# Patient Record
Sex: MALE | Race: BLACK OR AFRICAN AMERICAN | NOT HISPANIC OR LATINO | Employment: FULL TIME | URBAN - METROPOLITAN AREA
[De-identification: names, ages, dates, MRNs, and addresses within clinical notes are randomized per-mention and may not be internally consistent; named-entity substitution may affect disease eponyms.]

---

## 2021-09-16 ENCOUNTER — HOSPITAL ENCOUNTER (EMERGENCY)
Facility: HOSPITAL | Age: 56
Discharge: HOME | End: 2021-09-16
Attending: EMERGENCY MEDICINE
Payer: COMMERCIAL

## 2021-09-16 VITALS
OXYGEN SATURATION: 97 % | HEART RATE: 85 BPM | RESPIRATION RATE: 18 BRPM | BODY MASS INDEX: 45.1 KG/M2 | WEIGHT: 315 LBS | TEMPERATURE: 100 F | DIASTOLIC BLOOD PRESSURE: 82 MMHG | SYSTOLIC BLOOD PRESSURE: 138 MMHG | HEIGHT: 70 IN

## 2021-09-16 DIAGNOSIS — S82.142A CLOSED FRACTURE OF LEFT TIBIAL PLATEAU, INITIAL ENCOUNTER: Primary | ICD-10-CM

## 2021-09-16 DIAGNOSIS — S89.92XA LEFT KNEE INJURY: ICD-10-CM

## 2021-09-16 PROCEDURE — 99284 EMERGENCY DEPT VISIT MOD MDM: CPT | Mod: 25

## 2021-09-16 PROCEDURE — 29505 APPLICATION LONG LEG SPLINT: CPT | Mod: LT

## 2021-09-16 PROCEDURE — 25000003 PHARM REV CODE 250: Performed by: REGISTERED NURSE

## 2021-09-16 RX ORDER — HYDROCODONE BITARTRATE AND ACETAMINOPHEN 10; 325 MG/1; MG/1
1 TABLET ORAL EVERY 6 HOURS PRN
Qty: 12 TABLET | Refills: 0 | Status: SHIPPED | OUTPATIENT
Start: 2021-09-16 | End: 2022-01-28

## 2021-09-16 RX ORDER — HYDROCODONE BITARTRATE AND ACETAMINOPHEN 10; 325 MG/1; MG/1
1 TABLET ORAL
Status: COMPLETED | OUTPATIENT
Start: 2021-09-16 | End: 2021-09-16

## 2021-09-16 RX ADMIN — HYDROCODONE BITARTRATE AND ACETAMINOPHEN 1 TABLET: 10; 325 TABLET ORAL at 10:09

## 2021-09-17 ENCOUNTER — TELEPHONE (OUTPATIENT)
Dept: ORTHOPEDICS | Facility: CLINIC | Age: 56
End: 2021-09-17

## 2021-09-20 ENCOUNTER — HOSPITAL ENCOUNTER (OUTPATIENT)
Dept: RADIOLOGY | Facility: HOSPITAL | Age: 56
Discharge: HOME | End: 2021-09-20
Attending: ORTHOPAEDIC SURGERY
Payer: COMMERCIAL

## 2021-09-20 ENCOUNTER — OFFICE VISIT (OUTPATIENT)
Dept: ORTHOPEDICS | Facility: CLINIC | Age: 56
End: 2021-09-20
Payer: COMMERCIAL

## 2021-09-20 DIAGNOSIS — S82.145A CLOSED NONDISPLACED BICONDYLAR FRACTURE OF LEFT TIBIA, INITIAL ENCOUNTER: ICD-10-CM

## 2021-09-20 DIAGNOSIS — S82.145A CLOSED NONDISPLACED BICONDYLAR FRACTURE OF LEFT TIBIA, INITIAL ENCOUNTER: Primary | ICD-10-CM

## 2021-09-20 PROCEDURE — 99203 PR OFFICE/OUTPT VISIT, NEW, LEVL III, 30-44 MIN: ICD-10-PCS | Mod: 57,S$GLB,, | Performed by: ORTHOPAEDIC SURGERY

## 2021-09-20 PROCEDURE — 73560 X-RAY EXAM OF KNEE 1 OR 2: CPT | Mod: TC,LT

## 2021-09-20 PROCEDURE — 1159F MED LIST DOCD IN RCRD: CPT | Mod: CPTII,S$GLB,, | Performed by: ORTHOPAEDIC SURGERY

## 2021-09-20 PROCEDURE — 73560 XR KNEE 1 OR 2 VIEW LEFT: ICD-10-PCS | Mod: 26,LT,, | Performed by: RADIOLOGY

## 2021-09-20 PROCEDURE — 99999 PR PBB SHADOW E&M-EST. PATIENT-LVL III: CPT | Mod: PBBFAC,,, | Performed by: ORTHOPAEDIC SURGERY

## 2021-09-20 PROCEDURE — 93971 US LOWER EXTREMITY VEINS LEFT: ICD-10-PCS | Mod: 26,LT,, | Performed by: RADIOLOGY

## 2021-09-20 PROCEDURE — 93971 EXTREMITY STUDY: CPT | Mod: TC,LT

## 2021-09-20 PROCEDURE — 27530 PR CLOSED RX TIBIAL PLATEAU FX: ICD-10-PCS | Mod: LT,S$GLB,, | Performed by: ORTHOPAEDIC SURGERY

## 2021-09-20 PROCEDURE — 99999 PR PBB SHADOW E&M-EST. PATIENT-LVL III: ICD-10-PCS | Mod: PBBFAC,,, | Performed by: ORTHOPAEDIC SURGERY

## 2021-09-20 PROCEDURE — 27530 TREAT KNEE FRACTURE: CPT | Mod: LT,S$GLB,, | Performed by: ORTHOPAEDIC SURGERY

## 2021-09-20 PROCEDURE — 99203 OFFICE O/P NEW LOW 30 MIN: CPT | Mod: 57,S$GLB,, | Performed by: ORTHOPAEDIC SURGERY

## 2021-09-20 PROCEDURE — 1159F PR MEDICATION LIST DOCUMENTED IN MEDICAL RECORD: ICD-10-PCS | Mod: CPTII,S$GLB,, | Performed by: ORTHOPAEDIC SURGERY

## 2021-09-20 PROCEDURE — 73560 X-RAY EXAM OF KNEE 1 OR 2: CPT | Mod: 26,LT,, | Performed by: RADIOLOGY

## 2021-09-20 PROCEDURE — 93971 EXTREMITY STUDY: CPT | Mod: 26,LT,, | Performed by: RADIOLOGY

## 2021-09-20 RX ORDER — HYDROCHLOROTHIAZIDE 12.5 MG/1
12.5 TABLET ORAL DAILY
COMMUNITY
Start: 2021-07-15

## 2021-09-22 ENCOUNTER — CLINICAL SUPPORT (OUTPATIENT)
Dept: REHABILITATION | Facility: HOSPITAL | Age: 56
End: 2021-09-22
Attending: ORTHOPAEDIC SURGERY
Payer: COMMERCIAL

## 2021-09-22 DIAGNOSIS — S82.145A CLOSED NONDISPLACED BICONDYLAR FRACTURE OF LEFT TIBIA, INITIAL ENCOUNTER: ICD-10-CM

## 2021-09-22 DIAGNOSIS — R26.9 GAIT ABNORMALITY: ICD-10-CM

## 2021-09-22 DIAGNOSIS — M25.662 DECREASED RANGE OF MOTION OF LEFT KNEE: ICD-10-CM

## 2021-09-22 DIAGNOSIS — M62.81 QUADRICEPS WEAKNESS: ICD-10-CM
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
## 2021-09-22 PROCEDURE — 97162 PT EVAL MOD COMPLEX 30 MIN: CPT
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
## 2021-09-24 PROBLEM — M25.669 DECREASED RANGE OF MOTION (ROM) OF KNEE: Status: ACTIVE | Noted: 2021-09-24

## 2021-09-24 PROBLEM — R26.9 GAIT ABNORMALITY: Status: ACTIVE | Noted: 2021-09-24

## 2021-09-24 PROBLEM — M62.81 QUADRICEPS WEAKNESS: Status: ACTIVE | Noted: 2021-09-24

## 2021-09-27 ENCOUNTER — CLINICAL SUPPORT (OUTPATIENT)
Dept: REHABILITATION | Facility: HOSPITAL | Age: 56
End: 2021-09-27
Payer: COMMERCIAL

## 2021-09-27 DIAGNOSIS — M25.662 DECREASED RANGE OF MOTION OF LEFT KNEE: ICD-10-CM

## 2021-09-27 DIAGNOSIS — R26.9 GAIT ABNORMALITY: ICD-10-CM
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
## 2021-09-27 DIAGNOSIS — M62.81 QUADRICEPS WEAKNESS: ICD-10-CM
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
## 2021-09-27 PROCEDURE — 97110 THERAPEUTIC EXERCISES: CPT

## 2021-10-01 ENCOUNTER — CLINICAL SUPPORT (OUTPATIENT)
Dept: REHABILITATION | Facility: HOSPITAL | Age: 56
End: 2021-10-01
Payer: COMMERCIAL

## 2021-10-01 DIAGNOSIS — M25.662 DECREASED RANGE OF MOTION OF LEFT KNEE: ICD-10-CM
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
## 2021-10-01 DIAGNOSIS — M62.81 QUADRICEPS WEAKNESS: ICD-10-CM
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
## 2021-10-01 DIAGNOSIS — R26.9 GAIT ABNORMALITY: ICD-10-CM

## 2021-10-01 PROCEDURE — 97110 THERAPEUTIC EXERCISES: CPT

## 2021-10-04 ENCOUNTER — CLINICAL SUPPORT (OUTPATIENT)
Dept: REHABILITATION | Facility: HOSPITAL | Age: 56
End: 2021-10-04
Payer: COMMERCIAL

## 2021-10-04 DIAGNOSIS — M62.81 QUADRICEPS WEAKNESS: ICD-10-CM
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
## 2021-10-04 DIAGNOSIS — R26.9 GAIT ABNORMALITY: ICD-10-CM

## 2021-10-04 PROCEDURE — 97110 THERAPEUTIC EXERCISES: CPT

## 2021-10-05 DIAGNOSIS — M25.562 LEFT KNEE PAIN, UNSPECIFIED CHRONICITY: Primary | ICD-10-CM

## 2021-10-06 ENCOUNTER — HOSPITAL ENCOUNTER (OUTPATIENT)
Dept: RADIOLOGY | Facility: HOSPITAL | Age: 56
Discharge: HOME | End: 2021-10-06
Attending: PHYSICIAN ASSISTANT
Payer: COMMERCIAL

## 2021-10-06 ENCOUNTER — CLINICAL SUPPORT (OUTPATIENT)
Dept: REHABILITATION | Facility: HOSPITAL | Age: 56
End: 2021-10-06
Payer: COMMERCIAL

## 2021-10-06 ENCOUNTER — OFFICE VISIT (OUTPATIENT)
Dept: ORTHOPEDICS | Facility: CLINIC | Age: 56
End: 2021-10-06
Payer: COMMERCIAL

## 2021-10-06 VITALS — BODY MASS INDEX: 45.1 KG/M2 | HEIGHT: 70 IN | WEIGHT: 315 LBS

## 2021-10-06 DIAGNOSIS — M25.669 DECREASED RANGE OF MOTION OF KNEE, UNSPECIFIED LATERALITY: ICD-10-CM

## 2021-10-06 DIAGNOSIS — M25.562 LEFT KNEE PAIN, UNSPECIFIED CHRONICITY: ICD-10-CM

## 2021-10-06 DIAGNOSIS — M62.81 QUADRICEPS WEAKNESS: ICD-10-CM
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
## 2021-10-06 DIAGNOSIS — R26.9 GAIT ABNORMALITY: ICD-10-CM
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
## 2021-10-06 DIAGNOSIS — M25.562 LEFT KNEE PAIN, UNSPECIFIED CHRONICITY: Primary | ICD-10-CM

## 2021-10-06 DIAGNOSIS — S82.145A CLOSED NONDISPLACED BICONDYLAR FRACTURE OF LEFT TIBIA, INITIAL ENCOUNTER: ICD-10-CM

## 2021-10-06 PROCEDURE — 99999 PR PBB SHADOW E&M-EST. PATIENT-LVL III: ICD-10-PCS | Mod: PBBFAC,,, | Performed by: PHYSICIAN ASSISTANT

## 2021-10-06 PROCEDURE — 73562 XR KNEE ORTHO LEFT: ICD-10-PCS | Mod: 26,LT,, | Performed by: RADIOLOGY

## 2021-10-06 PROCEDURE — 1160F PR REVIEW ALL MEDS BY PRESCRIBER/CLIN PHARMACIST DOCUMENTED: ICD-10-PCS | Mod: CPTII,S$GLB,, | Performed by: PHYSICIAN ASSISTANT

## 2021-10-06 PROCEDURE — 3008F PR BODY MASS INDEX (BMI) DOCUMENTED: ICD-10-PCS | Mod: CPTII,S$GLB,, | Performed by: PHYSICIAN ASSISTANT

## 2021-10-06 PROCEDURE — 97110 THERAPEUTIC EXERCISES: CPT

## 2021-10-06 PROCEDURE — 3008F BODY MASS INDEX DOCD: CPT | Mod: CPTII,S$GLB,, | Performed by: PHYSICIAN ASSISTANT

## 2021-10-06 PROCEDURE — 73562 X-RAY EXAM OF KNEE 3: CPT | Mod: 26,LT,, | Performed by: RADIOLOGY

## 2021-10-06 PROCEDURE — 99024 PR POST-OP FOLLOW-UP VISIT: ICD-10-PCS | Mod: S$GLB,,, | Performed by: PHYSICIAN ASSISTANT

## 2021-10-06 PROCEDURE — 1159F PR MEDICATION LIST DOCUMENTED IN MEDICAL RECORD: ICD-10-PCS | Mod: CPTII,S$GLB,, | Performed by: PHYSICIAN ASSISTANT

## 2021-10-06 PROCEDURE — 73560 X-RAY EXAM OF KNEE 1 OR 2: CPT | Mod: 26,RT,, | Performed by: RADIOLOGY

## 2021-10-06 PROCEDURE — 1159F MED LIST DOCD IN RCRD: CPT | Mod: CPTII,S$GLB,, | Performed by: PHYSICIAN ASSISTANT

## 2021-10-06 PROCEDURE — 73560 XR KNEE ORTHO LEFT: ICD-10-PCS | Mod: 26,RT,, | Performed by: RADIOLOGY

## 2021-10-06 PROCEDURE — 1160F RVW MEDS BY RX/DR IN RCRD: CPT | Mod: CPTII,S$GLB,, | Performed by: PHYSICIAN ASSISTANT

## 2021-10-06 PROCEDURE — 73560 X-RAY EXAM OF KNEE 1 OR 2: CPT | Mod: TC,RT

## 2021-10-06 PROCEDURE — 99999 PR PBB SHADOW E&M-EST. PATIENT-LVL III: CPT | Mod: PBBFAC,,, | Performed by: PHYSICIAN ASSISTANT

## 2021-10-06 PROCEDURE — 99024 POSTOP FOLLOW-UP VISIT: CPT | Mod: S$GLB,,, | Performed by: PHYSICIAN ASSISTANT

## 2021-10-11 ENCOUNTER — CLINICAL SUPPORT (OUTPATIENT)
Dept: REHABILITATION | Facility: HOSPITAL | Age: 56
End: 2021-10-11
Payer: COMMERCIAL

## 2021-10-11 DIAGNOSIS — R26.9 GAIT ABNORMALITY: ICD-10-CM
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
## 2021-10-11 DIAGNOSIS — M25.669 DECREASED RANGE OF MOTION OF KNEE, UNSPECIFIED LATERALITY: ICD-10-CM
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
## 2021-10-11 DIAGNOSIS — M62.81 QUADRICEPS WEAKNESS: ICD-10-CM

## 2021-10-11 PROCEDURE — 97112 NEUROMUSCULAR REEDUCATION: CPT

## 2021-10-11 PROCEDURE — 97110 THERAPEUTIC EXERCISES: CPT

## 2021-10-13 ENCOUNTER — CLINICAL SUPPORT (OUTPATIENT)
Dept: REHABILITATION | Facility: HOSPITAL | Age: 56
End: 2021-10-13
Payer: COMMERCIAL

## 2021-10-13 ENCOUNTER — TELEPHONE (OUTPATIENT)
Dept: ORTHOPEDICS | Facility: CLINIC | Age: 56
End: 2021-10-13
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
## 2021-10-13 DIAGNOSIS — R26.9 GAIT ABNORMALITY: ICD-10-CM

## 2021-10-13 DIAGNOSIS — M62.81 QUADRICEPS WEAKNESS: ICD-10-CM
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
## 2021-10-13 PROCEDURE — 97112 NEUROMUSCULAR REEDUCATION: CPT

## 2021-10-13 PROCEDURE — 97110 THERAPEUTIC EXERCISES: CPT

## 2021-10-18 ENCOUNTER — CLINICAL SUPPORT (OUTPATIENT)
Dept: REHABILITATION | Facility: HOSPITAL | Age: 56
End: 2021-10-18
Payer: COMMERCIAL

## 2021-10-18 DIAGNOSIS — M62.81 QUADRICEPS WEAKNESS: ICD-10-CM
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
## 2021-10-18 DIAGNOSIS — S82.145A CLOSED NONDISPLACED BICONDYLAR FRACTURE OF LEFT TIBIA, INITIAL ENCOUNTER: Primary | ICD-10-CM

## 2021-10-18 DIAGNOSIS — M25.669 DECREASED RANGE OF MOTION OF KNEE, UNSPECIFIED LATERALITY: ICD-10-CM

## 2021-10-18 DIAGNOSIS — R26.9 GAIT ABNORMALITY: ICD-10-CM
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
## 2021-10-18 PROCEDURE — 97112 NEUROMUSCULAR REEDUCATION: CPT

## 2021-10-18 PROCEDURE — 97110 THERAPEUTIC EXERCISES: CPT

## 2021-10-20 ENCOUNTER — CLINICAL SUPPORT (OUTPATIENT)
Dept: REHABILITATION | Facility: HOSPITAL | Age: 56
End: 2021-10-20
Payer: COMMERCIAL

## 2021-10-20 ENCOUNTER — TELEPHONE (OUTPATIENT)
Dept: ORTHOPEDICS | Facility: CLINIC | Age: 56
End: 2021-10-20
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
## 2021-10-20 DIAGNOSIS — M25.669 DECREASED RANGE OF MOTION OF KNEE, UNSPECIFIED LATERALITY: ICD-10-CM
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
## 2021-10-20 DIAGNOSIS — M62.81 QUADRICEPS WEAKNESS: ICD-10-CM

## 2021-10-20 DIAGNOSIS — R26.9 GAIT ABNORMALITY: ICD-10-CM

## 2021-10-20 PROCEDURE — 97112 NEUROMUSCULAR REEDUCATION: CPT

## 2021-10-20 PROCEDURE — 97110 THERAPEUTIC EXERCISES: CPT

## 2021-11-01 ENCOUNTER — CLINICAL SUPPORT (OUTPATIENT)
Dept: REHABILITATION | Facility: HOSPITAL | Age: 56
End: 2021-11-01
Payer: COMMERCIAL

## 2021-11-01 DIAGNOSIS — R26.9 GAIT ABNORMALITY: ICD-10-CM
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
## 2021-11-01 DIAGNOSIS — M25.669 DECREASED RANGE OF MOTION OF KNEE, UNSPECIFIED LATERALITY: ICD-10-CM
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
## 2021-11-01 DIAGNOSIS — M62.81 QUADRICEPS WEAKNESS: ICD-10-CM

## 2021-11-01 PROCEDURE — 97110 THERAPEUTIC EXERCISES: CPT

## 2021-11-01 PROCEDURE — 97112 NEUROMUSCULAR REEDUCATION: CPT

## 2021-11-02 DIAGNOSIS — M25.562 LEFT KNEE PAIN, UNSPECIFIED CHRONICITY: Primary | ICD-10-CM

## 2021-11-03 ENCOUNTER — HOSPITAL ENCOUNTER (OUTPATIENT)
Dept: RADIOLOGY | Facility: HOSPITAL | Age: 56
Discharge: HOME | End: 2021-11-03
Attending: ORTHOPAEDIC SURGERY
Payer: COMMERCIAL

## 2021-11-03 ENCOUNTER — OFFICE VISIT (OUTPATIENT)
Dept: ORTHOPEDICS | Facility: CLINIC | Age: 56
End: 2021-11-03
Payer: COMMERCIAL

## 2021-11-03 ENCOUNTER — CLINICAL SUPPORT (OUTPATIENT)
Dept: REHABILITATION | Facility: HOSPITAL | Age: 56
End: 2021-11-03
Payer: COMMERCIAL

## 2021-11-03 ENCOUNTER — TELEPHONE (OUTPATIENT)
Dept: ORTHOPEDICS | Facility: CLINIC | Age: 56
End: 2021-11-03
Payer: COMMERCIAL

## 2021-11-03 VITALS — WEIGHT: 315 LBS | HEIGHT: 70 IN | BODY MASS INDEX: 45.1 KG/M2

## 2021-11-03 DIAGNOSIS — M62.81 QUADRICEPS WEAKNESS: ICD-10-CM
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
## 2021-11-03 DIAGNOSIS — S82.145A CLOSED NONDISPLACED BICONDYLAR FRACTURE OF LEFT TIBIA, INITIAL ENCOUNTER: Primary | ICD-10-CM
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
## 2021-11-03 DIAGNOSIS — M25.562 LEFT KNEE PAIN, UNSPECIFIED CHRONICITY: ICD-10-CM

## 2021-11-03 DIAGNOSIS — M25.669 DECREASED RANGE OF MOTION OF KNEE, UNSPECIFIED LATERALITY: ICD-10-CM

## 2021-11-03 DIAGNOSIS — R26.9 GAIT ABNORMALITY: ICD-10-CM

## 2021-11-03 PROCEDURE — 97110 THERAPEUTIC EXERCISES: CPT

## 2021-11-03 PROCEDURE — 99024 PR POST-OP FOLLOW-UP VISIT: ICD-10-PCS | Mod: S$GLB,,, | Performed by: ORTHOPAEDIC SURGERY

## 2021-11-03 PROCEDURE — 73562 XR KNEE ORTHO LEFT WITH FLEXION: ICD-10-PCS | Mod: 26,RT,, | Performed by: RADIOLOGY

## 2021-11-03 PROCEDURE — 73564 X-RAY EXAM KNEE 4 OR MORE: CPT | Mod: TC,LT

## 2021-11-03 PROCEDURE — 99999 PR PBB SHADOW E&M-EST. PATIENT-LVL III: ICD-10-PCS | Mod: PBBFAC,,, | Performed by: ORTHOPAEDIC SURGERY

## 2021-11-03 PROCEDURE — 73562 X-RAY EXAM OF KNEE 3: CPT | Mod: 26,RT,, | Performed by: RADIOLOGY

## 2021-11-03 PROCEDURE — 99024 POSTOP FOLLOW-UP VISIT: CPT | Mod: S$GLB,,, | Performed by: ORTHOPAEDIC SURGERY

## 2021-11-03 PROCEDURE — 73564 X-RAY EXAM KNEE 4 OR MORE: CPT | Mod: 26,LT,, | Performed by: RADIOLOGY

## 2021-11-03 PROCEDURE — 99999 PR PBB SHADOW E&M-EST. PATIENT-LVL III: CPT | Mod: PBBFAC,,, | Performed by: ORTHOPAEDIC SURGERY

## 2021-11-03 PROCEDURE — 73564 XR KNEE ORTHO LEFT WITH FLEXION: ICD-10-PCS | Mod: 26,LT,, | Performed by: RADIOLOGY

## 2021-11-08 ENCOUNTER — CLINICAL SUPPORT (OUTPATIENT)
Dept: REHABILITATION | Facility: HOSPITAL | Age: 56
End: 2021-11-08
Payer: COMMERCIAL
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
## 2021-11-08 DIAGNOSIS — M62.81 QUADRICEPS WEAKNESS: ICD-10-CM

## 2021-11-08 DIAGNOSIS — R26.9 GAIT ABNORMALITY: ICD-10-CM

## 2021-11-08 DIAGNOSIS — M25.669 DECREASED RANGE OF MOTION OF KNEE, UNSPECIFIED LATERALITY: ICD-10-CM

## 2021-11-08 PROCEDURE — 97110 THERAPEUTIC EXERCISES: CPT

## 2021-11-10 ENCOUNTER — CLINICAL SUPPORT (OUTPATIENT)
Dept: REHABILITATION | Facility: HOSPITAL | Age: 56
End: 2021-11-10
Payer: COMMERCIAL

## 2021-11-10 DIAGNOSIS — S82.145A CLOSED NONDISPLACED BICONDYLAR FRACTURE OF LEFT TIBIA, INITIAL ENCOUNTER: ICD-10-CM

## 2021-11-10 PROCEDURE — 97110 THERAPEUTIC EXERCISES: CPT

## 2021-11-17 ENCOUNTER — CLINICAL SUPPORT (OUTPATIENT)
Dept: REHABILITATION | Facility: HOSPITAL | Age: 56
End: 2021-11-17
Payer: COMMERCIAL
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
## 2021-11-17 DIAGNOSIS — M25.669 DECREASED RANGE OF MOTION OF KNEE, UNSPECIFIED LATERALITY: ICD-10-CM

## 2021-11-17 DIAGNOSIS — M62.81 QUADRICEPS WEAKNESS: ICD-10-CM
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
## 2021-11-17 DIAGNOSIS — R26.9 GAIT ABNORMALITY: ICD-10-CM

## 2021-11-17 PROCEDURE — 97110 THERAPEUTIC EXERCISES: CPT | Performed by: PHYSICAL THERAPIST

## 2021-11-19 ENCOUNTER — CLINICAL SUPPORT (OUTPATIENT)
Dept: REHABILITATION | Facility: HOSPITAL | Age: 56
End: 2021-11-19
Payer: COMMERCIAL
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
## 2021-11-19 DIAGNOSIS — M25.669 DECREASED RANGE OF MOTION OF KNEE, UNSPECIFIED LATERALITY: ICD-10-CM

## 2021-11-19 DIAGNOSIS — R26.9 GAIT ABNORMALITY: ICD-10-CM
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
## 2021-11-19 DIAGNOSIS — M62.81 QUADRICEPS WEAKNESS: ICD-10-CM

## 2021-11-19 PROCEDURE — 97110 THERAPEUTIC EXERCISES: CPT

## 2021-11-19 PROCEDURE — 97112 NEUROMUSCULAR REEDUCATION: CPT

## 2021-11-22 ENCOUNTER — CLINICAL SUPPORT (OUTPATIENT)
Dept: REHABILITATION | Facility: HOSPITAL | Age: 56
End: 2021-11-22
Payer: COMMERCIAL

## 2021-11-22 DIAGNOSIS — M25.669 DECREASED RANGE OF MOTION OF KNEE, UNSPECIFIED LATERALITY: ICD-10-CM

## 2021-11-22 DIAGNOSIS — M62.81 QUADRICEPS WEAKNESS: ICD-10-CM
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
## 2021-11-22 DIAGNOSIS — R26.9 GAIT ABNORMALITY: ICD-10-CM
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
## 2021-11-22 PROCEDURE — 97110 THERAPEUTIC EXERCISES: CPT

## 2021-11-26 DIAGNOSIS — S82.145A CLOSED NONDISPLACED BICONDYLAR FRACTURE OF LEFT TIBIA, INITIAL ENCOUNTER: Primary | ICD-10-CM

## 2021-12-01 ENCOUNTER — CLINICAL SUPPORT (OUTPATIENT)
Dept: REHABILITATION | Facility: HOSPITAL | Age: 56
End: 2021-12-01
Payer: COMMERCIAL

## 2021-12-01 ENCOUNTER — OFFICE VISIT (OUTPATIENT)
Dept: ORTHOPEDICS | Facility: CLINIC | Age: 56
End: 2021-12-01
Payer: COMMERCIAL

## 2021-12-01 ENCOUNTER — HOSPITAL ENCOUNTER (OUTPATIENT)
Dept: RADIOLOGY | Facility: HOSPITAL | Age: 56
Discharge: HOME | End: 2021-12-01
Attending: PHYSICIAN ASSISTANT
Payer: COMMERCIAL

## 2021-12-01 VITALS — WEIGHT: 315 LBS | HEIGHT: 70 IN | BODY MASS INDEX: 45.1 KG/M2

## 2021-12-01 DIAGNOSIS — S82.145A CLOSED NONDISPLACED BICONDYLAR FRACTURE OF LEFT TIBIA, INITIAL ENCOUNTER: Primary | ICD-10-CM

## 2021-12-01 DIAGNOSIS — R26.9 GAIT ABNORMALITY: ICD-10-CM

## 2021-12-01 DIAGNOSIS — M25.669 DECREASED RANGE OF MOTION OF KNEE, UNSPECIFIED LATERALITY: ICD-10-CM
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
## 2021-12-01 DIAGNOSIS — M25.562 LEFT KNEE PAIN, UNSPECIFIED CHRONICITY: ICD-10-CM

## 2021-12-01 DIAGNOSIS — M62.81 QUADRICEPS WEAKNESS: ICD-10-CM
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
## 2021-12-01 DIAGNOSIS — S82.145A CLOSED NONDISPLACED BICONDYLAR FRACTURE OF LEFT TIBIA, INITIAL ENCOUNTER: ICD-10-CM

## 2021-12-01 PROCEDURE — 99999 PR PBB SHADOW E&M-EST. PATIENT-LVL III: ICD-10-PCS | Mod: PBBFAC,,, | Performed by: PHYSICIAN ASSISTANT

## 2021-12-01 PROCEDURE — 97112 NEUROMUSCULAR REEDUCATION: CPT

## 2021-12-01 PROCEDURE — 73564 X-RAY EXAM KNEE 4 OR MORE: CPT | Mod: TC,LT

## 2021-12-01 PROCEDURE — 73564 X-RAY EXAM KNEE 4 OR MORE: CPT | Mod: 26,LT,, | Performed by: RADIOLOGY

## 2021-12-01 PROCEDURE — 99024 PR POST-OP FOLLOW-UP VISIT: ICD-10-PCS | Mod: S$GLB,,, | Performed by: PHYSICIAN ASSISTANT

## 2021-12-01 PROCEDURE — 99999 PR PBB SHADOW E&M-EST. PATIENT-LVL III: CPT | Mod: PBBFAC,,, | Performed by: PHYSICIAN ASSISTANT

## 2021-12-01 PROCEDURE — 73562 X-RAY EXAM OF KNEE 3: CPT | Mod: 26,RT,, | Performed by: RADIOLOGY

## 2021-12-01 PROCEDURE — 99024 POSTOP FOLLOW-UP VISIT: CPT | Mod: S$GLB,,, | Performed by: PHYSICIAN ASSISTANT

## 2021-12-01 PROCEDURE — 73562 XR KNEE ORTHO LEFT WITH FLEXION: ICD-10-PCS | Mod: 26,RT,, | Performed by: RADIOLOGY

## 2021-12-01 PROCEDURE — 73564 XR KNEE ORTHO LEFT WITH FLEXION: ICD-10-PCS | Mod: 26,LT,, | Performed by: RADIOLOGY

## 2021-12-01 PROCEDURE — 97110 THERAPEUTIC EXERCISES: CPT

## 2021-12-06 ENCOUNTER — CLINICAL SUPPORT (OUTPATIENT)
Dept: REHABILITATION | Facility: HOSPITAL | Age: 56
End: 2021-12-06
Payer: COMMERCIAL

## 2021-12-06 DIAGNOSIS — R26.9 GAIT ABNORMALITY: ICD-10-CM

## 2021-12-06 DIAGNOSIS — M62.81 QUADRICEPS WEAKNESS: ICD-10-CM

## 2021-12-06 PROCEDURE — 97116 GAIT TRAINING THERAPY: CPT

## 2021-12-06 PROCEDURE — 97140 MANUAL THERAPY 1/> REGIONS: CPT

## 2021-12-06 PROCEDURE — 97110 THERAPEUTIC EXERCISES: CPT

## 2021-12-08 ENCOUNTER — CLINICAL SUPPORT (OUTPATIENT)
Dept: REHABILITATION | Facility: HOSPITAL | Age: 56
End: 2021-12-08
Payer: COMMERCIAL

## 2021-12-08 DIAGNOSIS — M62.81 QUADRICEPS WEAKNESS: ICD-10-CM
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
## 2021-12-08 DIAGNOSIS — R26.9 GAIT ABNORMALITY: ICD-10-CM
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
## 2021-12-08 PROCEDURE — 97110 THERAPEUTIC EXERCISES: CPT

## 2021-12-08 PROCEDURE — 97112 NEUROMUSCULAR REEDUCATION: CPT

## 2021-12-13 ENCOUNTER — CLINICAL SUPPORT (OUTPATIENT)
Dept: REHABILITATION | Facility: HOSPITAL | Age: 56
End: 2021-12-13
Payer: COMMERCIAL

## 2021-12-13 DIAGNOSIS — R26.9 GAIT ABNORMALITY: ICD-10-CM
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
## 2021-12-13 DIAGNOSIS — M25.669 DECREASED RANGE OF MOTION OF KNEE, UNSPECIFIED LATERALITY: ICD-10-CM
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
## 2021-12-13 DIAGNOSIS — M62.81 QUADRICEPS WEAKNESS: ICD-10-CM

## 2021-12-13 PROCEDURE — 97110 THERAPEUTIC EXERCISES: CPT

## 2021-12-13 PROCEDURE — 97112 NEUROMUSCULAR REEDUCATION: CPT

## 2021-12-15 ENCOUNTER — CLINICAL SUPPORT (OUTPATIENT)
Dept: REHABILITATION | Facility: HOSPITAL | Age: 56
End: 2021-12-15
Payer: COMMERCIAL

## 2021-12-15 DIAGNOSIS — R26.9 GAIT ABNORMALITY: ICD-10-CM

## 2021-12-15 DIAGNOSIS — M25.669 DECREASED RANGE OF MOTION OF KNEE, UNSPECIFIED LATERALITY: ICD-10-CM
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
## 2021-12-15 DIAGNOSIS — M62.81 QUADRICEPS WEAKNESS: ICD-10-CM

## 2021-12-15 PROCEDURE — 97110 THERAPEUTIC EXERCISES: CPT

## 2021-12-15 PROCEDURE — 97112 NEUROMUSCULAR REEDUCATION: CPT

## 2021-12-20 ENCOUNTER — CLINICAL SUPPORT (OUTPATIENT)
Dept: REHABILITATION | Facility: HOSPITAL | Age: 56
End: 2021-12-20
Payer: COMMERCIAL
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
## 2021-12-20 DIAGNOSIS — M62.81 QUADRICEPS WEAKNESS: ICD-10-CM

## 2021-12-20 DIAGNOSIS — M25.669 DECREASED RANGE OF MOTION OF KNEE, UNSPECIFIED LATERALITY: ICD-10-CM

## 2021-12-20 DIAGNOSIS — R26.9 GAIT ABNORMALITY: ICD-10-CM

## 2021-12-20 PROCEDURE — 97110 THERAPEUTIC EXERCISES: CPT | Performed by: PHYSICAL THERAPIST

## 2021-12-20 PROCEDURE — 97112 NEUROMUSCULAR REEDUCATION: CPT | Performed by: PHYSICAL THERAPIST

## 2021-12-22 ENCOUNTER — CLINICAL SUPPORT (OUTPATIENT)
Dept: REHABILITATION | Facility: HOSPITAL | Age: 56
End: 2021-12-22
Payer: COMMERCIAL

## 2021-12-22 DIAGNOSIS — M25.662 DECREASED RANGE OF MOTION OF LEFT KNEE: ICD-10-CM
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
## 2021-12-22 DIAGNOSIS — R26.9 GAIT ABNORMALITY: ICD-10-CM

## 2021-12-22 DIAGNOSIS — M62.81 QUADRICEPS WEAKNESS: ICD-10-CM

## 2021-12-22 PROCEDURE — 97110 THERAPEUTIC EXERCISES: CPT

## 2021-12-22 PROCEDURE — 97112 NEUROMUSCULAR REEDUCATION: CPT

## 2021-12-27 ENCOUNTER — CLINICAL SUPPORT (OUTPATIENT)
Dept: REHABILITATION | Facility: HOSPITAL | Age: 56
End: 2021-12-27
Payer: COMMERCIAL

## 2021-12-27 DIAGNOSIS — R26.9 GAIT ABNORMALITY: ICD-10-CM
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
## 2021-12-27 DIAGNOSIS — M25.662 DECREASED RANGE OF MOTION OF LEFT KNEE: ICD-10-CM
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
## 2021-12-27 DIAGNOSIS — M62.81 QUADRICEPS WEAKNESS: ICD-10-CM

## 2021-12-27 PROCEDURE — 97110 THERAPEUTIC EXERCISES: CPT

## 2021-12-27 PROCEDURE — 97112 NEUROMUSCULAR REEDUCATION: CPT

## 2021-12-29 ENCOUNTER — OFFICE VISIT (OUTPATIENT)
Dept: URGENT CARE | Facility: CLINIC | Age: 56
End: 2021-12-29
Payer: COMMERCIAL

## 2021-12-29 VITALS
TEMPERATURE: 99 F | WEIGHT: 315 LBS | OXYGEN SATURATION: 100 % | SYSTOLIC BLOOD PRESSURE: 155 MMHG | HEART RATE: 79 BPM | DIASTOLIC BLOOD PRESSURE: 81 MMHG | RESPIRATION RATE: 18 BRPM | BODY MASS INDEX: 45.1 KG/M2 | HEIGHT: 70 IN

## 2021-12-29 DIAGNOSIS — U07.1 COVID-19: Primary | ICD-10-CM

## 2021-12-29 DIAGNOSIS — J02.9 SORE THROAT: ICD-10-CM

## 2021-12-29 DIAGNOSIS — U07.1 COVID-19 VIRUS DETECTED: ICD-10-CM

## 2021-12-29 LAB
CTP QC/QA: YES
SARS-COV-2 RDRP RESP QL NAA+PROBE: POSITIVE

## 2021-12-29 PROCEDURE — 1159F PR MEDICATION LIST DOCUMENTED IN MEDICAL RECORD: ICD-10-PCS | Mod: CPTII,S$GLB,, | Performed by: NURSE PRACTITIONER

## 2021-12-29 PROCEDURE — 3008F BODY MASS INDEX DOCD: CPT | Mod: CPTII,S$GLB,, | Performed by: NURSE PRACTITIONER

## 2021-12-29 PROCEDURE — 3079F PR MOST RECENT DIASTOLIC BLOOD PRESSURE 80-89 MM HG: ICD-10-PCS | Mod: CPTII,S$GLB,, | Performed by: NURSE PRACTITIONER

## 2021-12-29 PROCEDURE — 3008F PR BODY MASS INDEX (BMI) DOCUMENTED: ICD-10-PCS | Mod: CPTII,S$GLB,, | Performed by: NURSE PRACTITIONER

## 2021-12-29 PROCEDURE — 3079F DIAST BP 80-89 MM HG: CPT | Mod: CPTII,S$GLB,, | Performed by: NURSE PRACTITIONER

## 2021-12-29 PROCEDURE — 99203 OFFICE O/P NEW LOW 30 MIN: CPT | Mod: S$GLB,,, | Performed by: NURSE PRACTITIONER

## 2021-12-29 PROCEDURE — U0002 COVID-19 LAB TEST NON-CDC: HCPCS | Mod: QW,S$GLB,, | Performed by: NURSE PRACTITIONER

## 2021-12-29 PROCEDURE — 3077F SYST BP >= 140 MM HG: CPT | Mod: CPTII,S$GLB,, | Performed by: NURSE PRACTITIONER

## 2021-12-29 PROCEDURE — 1160F PR REVIEW ALL MEDS BY PRESCRIBER/CLIN PHARMACIST DOCUMENTED: ICD-10-PCS | Mod: CPTII,S$GLB,, | Performed by: NURSE PRACTITIONER

## 2021-12-29 PROCEDURE — 1159F MED LIST DOCD IN RCRD: CPT | Mod: CPTII,S$GLB,, | Performed by: NURSE PRACTITIONER

## 2021-12-29 PROCEDURE — 1160F RVW MEDS BY RX/DR IN RCRD: CPT | Mod: CPTII,S$GLB,, | Performed by: NURSE PRACTITIONER

## 2021-12-29 PROCEDURE — 3077F PR MOST RECENT SYSTOLIC BLOOD PRESSURE >= 140 MM HG: ICD-10-PCS | Mod: CPTII,S$GLB,, | Performed by: NURSE PRACTITIONER

## 2021-12-29 PROCEDURE — U0002: ICD-10-PCS | Mod: QW,S$GLB,, | Performed by: NURSE PRACTITIONER

## 2021-12-29 PROCEDURE — 99203 PR OFFICE/OUTPT VISIT, NEW, LEVL III, 30-44 MIN: ICD-10-PCS | Mod: S$GLB,,, | Performed by: NURSE PRACTITIONER

## 2021-12-31 ENCOUNTER — PATIENT MESSAGE (OUTPATIENT)
Dept: ADMINISTRATIVE | Facility: OTHER | Age: 56
End: 2021-12-31
Payer: COMMERCIAL

## 2022-01-03 ENCOUNTER — PATIENT MESSAGE (OUTPATIENT)
Dept: ADMINISTRATIVE | Facility: OTHER | Age: 57
End: 2022-01-03
Payer: COMMERCIAL

## 2022-01-10 ENCOUNTER — CLINICAL SUPPORT (OUTPATIENT)
Dept: REHABILITATION | Facility: HOSPITAL | Age: 57
End: 2022-01-10
Payer: COMMERCIAL

## 2022-01-10 DIAGNOSIS — M25.662 DECREASED RANGE OF MOTION OF LEFT KNEE: ICD-10-CM
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
## 2022-01-10 DIAGNOSIS — M62.81 QUADRICEPS WEAKNESS: ICD-10-CM
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
## 2022-01-10 DIAGNOSIS — R26.9 GAIT ABNORMALITY: ICD-10-CM

## 2022-01-10 PROCEDURE — 97112 NEUROMUSCULAR REEDUCATION: CPT

## 2022-01-10 PROCEDURE — 97110 THERAPEUTIC EXERCISES: CPT

## 2022-01-10 NOTE — PROGRESS NOTES
Physical Therapy Daily Treatment Note     Name: Alexey [facility] Number: [id number]     Therapy Diagnosis:   Encounter Diagnoses   Name Primary?    Gait abnormality     Fall in home, subsequent encounter     Quadriceps weakness     Decreased range of motion of left knee      Physician: Matthew Anthony MD    Visit Date: 1/10/2022    Physician Orders: PT Eval and Treat  Medical Diagnosis from Referral: Closed nondisplaced bicondylar fracture of left tibia, initial encounter   Evaluation Date: 9/22/2021  Authorization Period Expiration: 12/31/2021 (Extension Required)  Plan of Care Expiration: 12/10/2021 (extend to 01/31/22)  Visit # / Visits authorized: 1/20 (+24 from previous authorization)  FOTO: 3/3(visit 18)    Progress Note #2 (performed on 12/22/2021)    Time In:1015  Time Out: 1110  Total Billable Time: 55 minutes     Precautions: Weightbearing full weight bearing now   Subjective     Pt reports: no pain reported today. Tightness reported in calf and low back. Strength continues to improve.      He was compliant with home exercise program.    Response to previous treatment: He felt good after last visit.     Functional change: Patient has no pain.    Pain: 0/10  Location: left knee      Objective        TREATMENT       Alexey received therapeutic exercises to develop strength, endurance, ROM, flexibility, posture and core stabilization for (43) minutes including:     Intervention Performed Today    Recumbent bike  Nu Step   x   Level 7, 6 minutes   Quad sets  3 minutes LLE   Heel Slide (0-90)  3x10    SHORT ARC QUAD   3 minutes, 2 pounds, core activation   side lying Hip Abduction  3 x 10 2lb   SLR with ankle pump  5slr x 10 ankle pump   Windshield wipers  3x10   sidelying Clams   20x (B)   Prone quad stretch with stretch rite  10 second holds x 3 minutes   LONG ARC QUAD   3x10 5 pounds    standing calf stretch  x 3x30s   Bridges  3 minutes  Modified Left foot forward for partial weight bearing  "  SLR   2 sets of 10 repetition, left lower extremity with assistance   Three way hip   3 x 10 LLE   Hamstring Curl standing  3 x 10 3 3LB RLE   Mini squats  x 3x10 advanced to full weight bearing 24" box   Supine marching  3 Minutes    Supine hip abduction  3 minutes with belt, with core activation   DOUBLE KNEE TO CHEST  x 15x   Pelvic tilt x 30x   Parallel bars: Standing Hip Flex  10 reps switching x 2 minutes   Standing Hip Extension  10 reps switching x 2 minutes   Standing Hip Abduction  10 reps switching x 2 minutes   Standing Heel Raises x 30 reps    Stairs x 2x5 laps       Plan for Next Visit:        Alexey participated in neuromuscular re-education activities to improve: Balance, Coordination, Kinesthetic, Sense, Proprioception and Posture for (12) minutes. The following activities were included:    Intervention Performed Today    Quad sets 5'   [place] 5/5   SHORT AR QUAD 5'  [place] 5/5   LONG ARC QUAD 5'  [place] 5/5   Terminal knee extension 5'  Russian 5/5 BLUE THERABAND    Training on heel toe gait pattern     Transition Heel to toe at the left leg during stance   X 3 minutes   Heel taps  x 3x0 (B) bottom stairs   Lateral walks/ Monster walks  x 6x20 yds blue band        Patient participated in manual therapy techniques 0 minutes:    Intervention Performed Today    End range knee flexion     Tibial rotation/screw home              Home Exercises Provided and Patient Education Provided     Education provided:   · - Patient educated on the impairments noted above and the effects of physical therapy intervention to improve overall condition and QOL.   · Patient was educated on all the above exercise prior/during/after for proper posture, positioning, and execution for safe performance with home exercise program.   · Patient educated on the use of pillows to aid in neutral alignment of spine and extremities when sleeping in supine or side lying.  · Patient educated on the importance of improved core and " upper and lower extremity strength in order to improve alignment of the spine and upper and lower extremities with static positions and dynamic movement.   Patient educated on the importance of strong core and lower extremity musculature in order to improve both static and dynamic balance, improve gait mechanics, reduce fall risk and improve household and community mobility.     Written Home Exercises Provided: Patient instructed to cont prior HEP.  Exercises were reviewed and Alexey was able to demonstrate them prior to the end of the session.  Alexey demonstrated good  understanding of the education provided.     See EMR under Patient Instructions for exercises provided prior visit.    Assessment     Alexey Alston tolerated PT session well with minimal complaints of pain or discomfort, secondary to poor motor control and muscle weakness causing calf and lumbar weakness. The patient continues to display improvement from initial evaluation with ROM and strength in his lowe extremity. Therapy exercises were reviewed by revisiting exercises given from previous home exercise program while adding core stability exercises.  Handouts were not issued during today's visit. Alexey demonstrated good understanding of new exercises and will continue to progress at home until next follow-up.          Pt prognosis is Good.     Pt will continue to benefit from skilled outpatient physical therapy to address the deficits listed in the problem list box on initial evaluation, provide pt/family education and to maximize pt's level of independence in the home and community environment.     Pt's spiritual, cultural and educational needs considered and pt agreeable to plan of care and goals.     Anticipated barriers to physical therapy: co-morbidities, sedentary lifestyle and occupation    Goals:       Short Term Goals:  6 weeks Progress    1. Pain: Pt will demonstrate improved pain by reports of less than or equal to 5/10 worst pain on  the verbal rating scale in order to progress toward maximal functional ability and improve QOL. MET   2. Function: Patient will demonstrate improved function as indicated by a functional limitation score of less than or equal to 59 out of 100 on FOTO. MET   3. Mobility: Patient will improve AROM to 50% of stated goals, listed in objective measures above, in order to progress towards independence with functional activities.  MET   4. Strength: Patient will improve strength to 50% of stated goals, listed in objective measures above, in order to progress towards independence with functional activities.  PC   5. HEP: Patient will demonstrate independence with HEP in order to progress toward functional independence. MET      Long Term Goals:  12 weeks Progress   1. Pain: Pt will demonstrate improved pain by reports of less than or equal to 2/10 worst pain on the verbal rating scale in order to progress toward maximal functional ability and improve QOL.   PC   2. Function: Patient will demonstrate improved function as indicated by a functional limitation score of less than or equal to 41 out of 100 on FOTO. PC   3. Mobility: Patient will improve AROM to stated goals, listed in objective measures above, in order to return to maximal functional potential and improve quality of life. PC   4. Strength: Patient will improve strength to stated goals, listed in objective measures above, in order to improve functional independence and quality of life. PC   5. Gait: Patient will demonstrate normalized gait mechanics with minimal compensation in order to return to PLOF. PC   6. Patient will return to normal ADL's, IADL's, community involvement, recreational activities, and work-related activities with less than or equal to 2/10 pain and maximal function.  PC      Goals Key:  PC= progressing/continue;        PM= partially met;             DC= discontinue      Plan     Continue POC and frequency as planned.     12/21/2021 (Extended  to 01/31/22)     These services are reasonable and necessary for the conditions set forth above while under my care.     Earl Angeles, PT, DPT

## 2022-01-14 ENCOUNTER — CLINICAL SUPPORT (OUTPATIENT)
Dept: REHABILITATION | Facility: HOSPITAL | Age: 57
End: 2022-01-14
Payer: COMMERCIAL

## 2022-01-14 DIAGNOSIS — M25.662 DECREASED RANGE OF MOTION OF LEFT KNEE: ICD-10-CM

## 2022-01-14 DIAGNOSIS — R26.9 GAIT ABNORMALITY: ICD-10-CM
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
## 2022-01-14 DIAGNOSIS — M62.81 QUADRICEPS WEAKNESS: ICD-10-CM
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
## 2022-01-14 PROCEDURE — 97110 THERAPEUTIC EXERCISES: CPT

## 2022-01-14 PROCEDURE — 97112 NEUROMUSCULAR REEDUCATION: CPT

## 2022-01-14 NOTE — PROGRESS NOTES
Physical Therapy Daily Treatment Note     Name: Alexey [facility] Number: [id number]     Therapy Diagnosis:   Encounter Diagnoses   Name Primary?    Gait abnormality     Fall in home, subsequent encounter     Quadriceps weakness     Decreased range of motion of left knee      Physician: Matthew Anthony MD    Visit Date: 1/14/2022    Physician Orders: PT Eval and Treat  Medical Diagnosis from Referral: Closed nondisplaced bicondylar fracture of left tibia, initial encounter   Evaluation Date: 9/22/2021  Authorization Period Expiration: 12/31/2022  Plan of Care Expiration: 12/10/2021 (extend to 01/31/22)  Visit # / Visits authorized: 2/20 (+24 from previous authorization)  FOTO: 3/3(visit 18)    Progress Note #2 (performed on 12/22/2021)    Time In:1000  Time Out: 1045  Total Billable Time: 45 minutes     Precautions: Weightbearing full weight bearing now   Subjective     Pt reports: no pain reported today. He is feeling a whole lot better and was a little sore after last visit. Today's treatment session was great.    He was compliant with home exercise program.    Response to previous treatment: He felt good after last visit.     Functional change: Patient has no pain.    Pain: 0/10  Location: left knee      Objective        TREATMENT       Alexey received therapeutic exercises to develop strength, endurance, ROM, flexibility, posture and core stabilization for (30) minutes including:     Intervention Performed Today    Recumbent bike  Nu Step   x   Level 5, 6 minutes   Quad sets  3 minutes LLE   Heel Slide (0-90)  3x10    SHORT ARC QUAD   3 minutes, 2 pounds, core activation   side lying Hip Abduction  3 x 10 2lb   SLR with ankle pump  5slr x 10 ankle pump   Windshield wipers  3x10   sidelying Clams   20x (B)   Prone quad stretch with stretch rite  10 second holds x 3 minutes   LONG ARC QUAD   3x10 5 pounds    standing calf stretch  x 3x30s   Bridges  3 minutes  Modified Left foot forward for partial  "weight bearing   SLR   2 sets of 10 repetition, left lower extremity with assistance   Three way hip   3 x 10 LLE   Hamstring Curl standing x 3 x 10 3 5# Right LOWER EXTREMITY    Box squats  x 3x10  20" boc (increased)   Supine marching  3 Minutes    Supine hip abduction  3 minutes with belt, with core activation   DOUBLE KNEE TO CHEST   15x   Pelvic tilt  30x   Parallel bars: Standing Hip Flex  10 reps switching x 2 minutes   Standing Hip Extension  10 reps switching x 2 minutes   Standing Hip Abduction  10 reps switching x 2 minutes   Standing Heel Raises x 30 reps    Stairs x 3x5 laps       Plan for Next Visit:        Alexey participated in neuromuscular re-education activities to improve: Balance, Coordination, Kinesthetic, Sense, Proprioception and Posture for (15) minutes. The following activities were included:    Intervention Performed Today    Quad sets 5'   Citizen of [place] 5/5   SHORT AR QUAD 5'  Citizen [place] 5/5   LONG ARC QUAD 5'  [place]izen [place] 5/5   Terminal knee extension 5'  Russian 5/5 BLUE THERABAND    Treadmill walking working on proper mechanics and reciprocal gait pattern x  Minutes progressing to 2.3 mph   Transition Heel to toe at the left leg during stance   X 3 minutes   Heel taps  x 3x0 (B) bottom stairs   Lateral walks/ Monster walks  x 6x20 yds blue band        Patient participated in manual therapy techniques 0 minutes:    Intervention Performed Today    End range knee flexion     Tibial rotation/screw home              Home Exercises Provided and Patient Education Provided     Education provided:   · - Patient educated on the impairments noted above and the effects of physical therapy intervention to improve overall condition and QOL.   · Patient was educated on all the above exercise prior/during/after for proper posture, positioning, and execution for safe performance with home exercise program.   · Patient educated on the use of pillows to aid in neutral alignment of spine and extremities when sleeping " in supine or side lying.  · Patient educated on the importance of improved core and upper and lower extremity strength in order to improve alignment of the spine and upper and lower extremities with static positions and dynamic movement.   Patient educated on the importance of strong core and lower extremity musculature in order to improve both static and dynamic balance, improve gait mechanics, reduce fall risk and improve household and community mobility.     Written Home Exercises Provided: Patient instructed to cont prior HEP.  Exercises were reviewed and Alexey was able to demonstrate them prior to the end of the session.  Alexey demonstrated good  understanding of the education provided.     See EMR under Patient Instructions for exercises provided prior visit.    Assessment     Alexey Alston tolerated PT session well with no complaints of pain or discomfort.. The patient continues to display improvement from initial evaluation with ROM and strength in his lower extremity. Therapy exercises were reviewed by revisiting exercises given from previous home exercise program while adding increased standing activities.  Handouts were not issued during today's visit. Alexey demonstrated good understanding of new exercises and will continue to progress at home until next follow-up.          Pt prognosis is Good.     Pt will continue to benefit from skilled outpatient physical therapy to address the deficits listed in the problem list box on initial evaluation, provide pt/family education and to maximize pt's level of independence in the home and community environment.     Pt's spiritual, cultural and educational needs considered and pt agreeable to plan of care and goals.     Anticipated barriers to physical therapy: co-morbidities, sedentary lifestyle and occupation    Goals:       Short Term Goals:  6 weeks Progress    1. Pain: Pt will demonstrate improved pain by reports of less than or equal to 5/10 worst pain on  the verbal rating scale in order to progress toward maximal functional ability and improve QOL. MET   2. Function: Patient will demonstrate improved function as indicated by a functional limitation score of less than or equal to 59 out of 100 on FOTO. MET   3. Mobility: Patient will improve AROM to 50% of stated goals, listed in objective measures above, in order to progress towards independence with functional activities.  MET   4. Strength: Patient will improve strength to 50% of stated goals, listed in objective measures above, in order to progress towards independence with functional activities.  PC   5. HEP: Patient will demonstrate independence with HEP in order to progress toward functional independence. MET      Long Term Goals:  12 weeks Progress   1. Pain: Pt will demonstrate improved pain by reports of less than or equal to 2/10 worst pain on the verbal rating scale in order to progress toward maximal functional ability and improve QOL.   PC   2. Function: Patient will demonstrate improved function as indicated by a functional limitation score of less than or equal to 41 out of 100 on FOTO. PC   3. Mobility: Patient will improve AROM to stated goals, listed in objective measures above, in order to return to maximal functional potential and improve quality of life. PC   4. Strength: Patient will improve strength to stated goals, listed in objective measures above, in order to improve functional independence and quality of life. PC   5. Gait: Patient will demonstrate normalized gait mechanics with minimal compensation in order to return to PLOF. PC   6. Patient will return to normal ADL's, IADL's, community involvement, recreational activities, and work-related activities with less than or equal to 2/10 pain and maximal function.  PC      Goals Key:  PC= progressing/continue;        PM= partially met;             DC= discontinue      Plan     Continue POC and frequency as planned.     12/21/2021 (Extended  to 01/31/22)     These services are reasonable and necessary for the conditions set forth above while under my care.     Earl Angeles, PT, DPT

## 2022-01-19 ENCOUNTER — CLINICAL SUPPORT (OUTPATIENT)
Dept: REHABILITATION | Facility: HOSPITAL | Age: 57
End: 2022-01-19
Payer: COMMERCIAL

## 2022-01-19 DIAGNOSIS — M62.81 QUADRICEPS WEAKNESS: ICD-10-CM
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
## 2022-01-19 DIAGNOSIS — M25.669 DECREASED RANGE OF MOTION OF KNEE, UNSPECIFIED LATERALITY: ICD-10-CM

## 2022-01-19 DIAGNOSIS — R26.9 GAIT ABNORMALITY: ICD-10-CM

## 2022-01-19 PROCEDURE — 97112 NEUROMUSCULAR REEDUCATION: CPT

## 2022-01-19 PROCEDURE — 97110 THERAPEUTIC EXERCISES: CPT

## 2022-01-19 NOTE — PROGRESS NOTES
Physical Therapy Daily Treatment Note     Name: Alexey [facility] Number: [id number]     Therapy Diagnosis:   Encounter Diagnoses   Name Primary?    Gait abnormality     Fall in home, sequela     Quadriceps weakness     Decreased range of motion of knee, unspecified laterality      Physician: Matthew Anthony MD    Visit Date: 1/19/2022    Physician Orders: PT Eval and Treat  Medical Diagnosis from Referral: Closed nondisplaced bicondylar fracture of left tibia, initial encounter   Evaluation Date: 9/22/2021  Authorization Period Expiration: 12/31/2022  Plan of Care Expiration: 12/10/2021 (extend to 01/31/22)  Visit # / Visits authorized: 2/20 (+24 from previous authorization)  FOTO: 3/3(visit 18)    Progress Note #2 (performed on 12/22/2021)    Time In:1000  Time Out: 1045  Total Billable Time: 45 minutes     Precautions: Weightbearing full weight bearing now   Subjective     Pt reports: no pain reported today. He is feeling a whole lot better and was a little sore after last visit. Today's treatment session was great.    He was compliant with home exercise program.    Response to previous treatment: He felt good after last visit.     Functional change: Patient has no pain.    Pain: 0/10  Location: left knee      Objective        TREATMENT       Alexey received therapeutic exercises to develop strength, endurance, ROM, flexibility, posture and core stabilization for (30) minutes including:     Intervention Performed Today    Recumbent bike  Nu Step   x   Level 5, 6 minutes   Quad sets  3 minutes LLE   Heel Slide (0-90)  3x10    SHORT ARC QUAD   3 minutes, 2 pounds, core activation   side lying Hip Abduction  3 x 10 2lb   SLR with ankle pump  5slr x 10 ankle pump   Windshield wipers  3x10   sidelying Clams   20x (B)   Prone quad stretch with stretch rite  10 second holds x 3 minutes   LONG ARC QUAD   3x10 5 pounds    standing calf stretch  x 3x30s   Bridges x 3x10   SLR   2 sets of 10 repetition, left  "lower extremity with assistance   Three way hip   3 x 10 LLE   Hamstring Curl standing x 3 x 10 3 5# Right LOWER EXTREMITY    SL Box squats  x 3x10  20" box (increased)   Supine marching  3 Minutes    Supine hip abduction  3 minutes with belt, with core activation   DOUBLE KNEE TO CHEST   15x   Pelvic tilt  30x   Parallel bars: Standing Hip Flex  10 reps switching x 2 minutes   Standing Hip Extension  10 reps switching x 2 minutes   Standing Hip Abduction  10 reps switching x 2 minutes   Standing Heel Raises  30 reps    Stairs  3x5 laps       Plan for Next Visit:        Alexey participated in neuromuscular re-education activities to improve: Balance, Coordination, Kinesthetic, Sense, Proprioception and Posture for (10) minutes. The following activities were included:    Intervention Performed Today    Quad sets 5'   Austrian 5/5   SHORT AR QUAD 5'  Austrian 5/5   LONG ARC QUAD 5'  Austrian 5/5   Terminal knee extension 5'  Russian 5/5 BLUE THERABAND    Treadmill walking working on proper mechanics and reciprocal gait pattern   Minutes progressing to 2.3 mph   Transition Heel to toe at the left leg during stance   X 3 minutes   Heel taps  x 3x0 (B) bottom stairs   Lateral walks/ Monster walks  x 6x20 yds blue band        Patient participated in manual therapy techniques 0 minutes:    Intervention Performed Today    End range knee flexion     Tibial rotation/screw home              Home Exercises Provided and Patient Education Provided     Education provided:   · - Patient educated on the impairments noted above and the effects of physical therapy intervention to improve overall condition and QOL.   · Patient was educated on all the above exercise prior/during/after for proper posture, positioning, and execution for safe performance with home exercise program.   · Patient educated on the use of pillows to aid in neutral alignment of spine and extremities when sleeping in supine or side lying.  · Patient educated on the " importance of improved core and upper and lower extremity strength in order to improve alignment of the spine and upper and lower extremities with static positions and dynamic movement.   Patient educated on the importance of strong core and lower extremity musculature in order to improve both static and dynamic balance, improve gait mechanics, reduce fall risk and improve household and community mobility.     Written Home Exercises Provided: Patient instructed to cont prior HEP.  Exercises were reviewed and Alexey was able to demonstrate them prior to the end of the session.  Alexey demonstrated good  understanding of the education provided.     See EMR under Patient Instructions for exercises provided prior visit.    Assessment     Alexey Alston tolerated PT session well with no complaints of pain or discomfort.. The patient continues to display improvement from initial evaluation with ROM and strength in his lower extremity. Therapy exercises were reviewed by revisiting exercises given from previous home exercise program while adding increased standing activities.  Handouts were not issued during today's visit. Alexey demonstrated good understanding of new exercises and will continue to progress at home until next follow-up. Therapist to start performing therapeutic activities without knee brace.       Pt prognosis is Good.     Pt will continue to benefit from skilled outpatient physical therapy to address the deficits listed in the problem list box on initial evaluation, provide pt/family education and to maximize pt's level of independence in the home and community environment.     Pt's spiritual, cultural and educational needs considered and pt agreeable to plan of care and goals.     Anticipated barriers to physical therapy: co-morbidities, sedentary lifestyle and occupation    Goals:       Short Term Goals:  6 weeks Progress    1. Pain: Pt will demonstrate improved pain by reports of less than or equal to  5/10 worst pain on the verbal rating scale in order to progress toward maximal functional ability and improve QOL. MET   2. Function: Patient will demonstrate improved function as indicated by a functional limitation score of less than or equal to 59 out of 100 on FOTO. MET   3. Mobility: Patient will improve AROM to 50% of stated goals, listed in objective measures above, in order to progress towards independence with functional activities.  MET   4. Strength: Patient will improve strength to 50% of stated goals, listed in objective measures above, in order to progress towards independence with functional activities.  PC   5. HEP: Patient will demonstrate independence with HEP in order to progress toward functional independence. MET      Long Term Goals:  12 weeks Progress   1. Pain: Pt will demonstrate improved pain by reports of less than or equal to 2/10 worst pain on the verbal rating scale in order to progress toward maximal functional ability and improve QOL.   PC   2. Function: Patient will demonstrate improved function as indicated by a functional limitation score of less than or equal to 41 out of 100 on FOTO. PC   3. Mobility: Patient will improve AROM to stated goals, listed in objective measures above, in order to return to maximal functional potential and improve quality of life. PC   4. Strength: Patient will improve strength to stated goals, listed in objective measures above, in order to improve functional independence and quality of life. PC   5. Gait: Patient will demonstrate normalized gait mechanics with minimal compensation in order to return to PLOF. PC   6. Patient will return to normal ADL's, IADL's, community involvement, recreational activities, and work-related activities with less than or equal to 2/10 pain and maximal function.  PC      Goals Key:  PC= progressing/continue;        PM= partially met;             DC= discontinue      Plan     Continue POC and frequency as planned.      12/21/2021 (Extended to 01/31/22)     These services are reasonable and necessary for the conditions set forth above while under my care.     Matthew Sen, PT, DPT

## 2022-01-26 DIAGNOSIS — M25.562 LEFT KNEE PAIN, UNSPECIFIED CHRONICITY: Primary | ICD-10-CM

## 2022-01-27 ENCOUNTER — HOSPITAL ENCOUNTER (OUTPATIENT)
Dept: RADIOLOGY | Facility: HOSPITAL | Age: 57
Discharge: HOME | End: 2022-01-27
Attending: PHYSICIAN ASSISTANT
Payer: COMMERCIAL

## 2022-01-27 ENCOUNTER — CLINICAL SUPPORT (OUTPATIENT)
Dept: REHABILITATION | Facility: HOSPITAL | Age: 57
End: 2022-01-27
Payer: COMMERCIAL

## 2022-01-27 ENCOUNTER — OFFICE VISIT (OUTPATIENT)
Dept: ORTHOPEDICS | Facility: CLINIC | Age: 57
End: 2022-01-27
Payer: COMMERCIAL

## 2022-01-27 VITALS — BODY MASS INDEX: 45.1 KG/M2 | HEIGHT: 70 IN | WEIGHT: 315 LBS

## 2022-01-27 DIAGNOSIS — M25.562 LEFT KNEE PAIN, UNSPECIFIED CHRONICITY: ICD-10-CM

## 2022-01-27 DIAGNOSIS — M62.81 QUADRICEPS WEAKNESS: ICD-10-CM

## 2022-01-27 DIAGNOSIS — M25.662 DECREASED RANGE OF MOTION OF LEFT KNEE: ICD-10-CM
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
## 2022-01-27 DIAGNOSIS — M25.562 LEFT KNEE PAIN, UNSPECIFIED CHRONICITY: Primary | ICD-10-CM

## 2022-01-27 DIAGNOSIS — S82.145D CLOSED NONDISPLACED BICONDYLAR FRACTURE OF LEFT TIBIA WITH ROUTINE HEALING, SUBSEQUENT ENCOUNTER: ICD-10-CM

## 2022-01-27 DIAGNOSIS — R26.9 GAIT ABNORMALITY: ICD-10-CM
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
## 2022-01-27 PROCEDURE — 1159F PR MEDICATION LIST DOCUMENTED IN MEDICAL RECORD: ICD-10-PCS | Mod: CPTII,S$GLB,, | Performed by: PHYSICIAN ASSISTANT

## 2022-01-27 PROCEDURE — 1159F MED LIST DOCD IN RCRD: CPT | Mod: CPTII,S$GLB,, | Performed by: PHYSICIAN ASSISTANT

## 2022-01-27 PROCEDURE — 99214 OFFICE O/P EST MOD 30 MIN: CPT | Mod: S$GLB,,, | Performed by: PHYSICIAN ASSISTANT

## 2022-01-27 PROCEDURE — 1160F PR REVIEW ALL MEDS BY PRESCRIBER/CLIN PHARMACIST DOCUMENTED: ICD-10-PCS | Mod: CPTII,S$GLB,, | Performed by: PHYSICIAN ASSISTANT

## 2022-01-27 PROCEDURE — 99999 PR PBB SHADOW E&M-EST. PATIENT-LVL III: CPT | Mod: PBBFAC,,, | Performed by: PHYSICIAN ASSISTANT

## 2022-01-27 PROCEDURE — 99214 PR OFFICE/OUTPT VISIT, EST, LEVL IV, 30-39 MIN: ICD-10-PCS | Mod: S$GLB,,, | Performed by: PHYSICIAN ASSISTANT

## 2022-01-27 PROCEDURE — 97112 NEUROMUSCULAR REEDUCATION: CPT

## 2022-01-27 PROCEDURE — 3008F BODY MASS INDEX DOCD: CPT | Mod: CPTII,S$GLB,, | Performed by: PHYSICIAN ASSISTANT

## 2022-01-27 PROCEDURE — 73564 X-RAY EXAM KNEE 4 OR MORE: CPT | Mod: 26,LT,, | Performed by: RADIOLOGY

## 2022-01-27 PROCEDURE — 97110 THERAPEUTIC EXERCISES: CPT

## 2022-01-27 PROCEDURE — 3008F PR BODY MASS INDEX (BMI) DOCUMENTED: ICD-10-PCS | Mod: CPTII,S$GLB,, | Performed by: PHYSICIAN ASSISTANT

## 2022-01-27 PROCEDURE — 1160F RVW MEDS BY RX/DR IN RCRD: CPT | Mod: CPTII,S$GLB,, | Performed by: PHYSICIAN ASSISTANT

## 2022-01-27 PROCEDURE — 73562 X-RAY EXAM OF KNEE 3: CPT | Mod: TC,RT,59

## 2022-01-27 PROCEDURE — 99999 PR PBB SHADOW E&M-EST. PATIENT-LVL III: ICD-10-PCS | Mod: PBBFAC,,, | Performed by: PHYSICIAN ASSISTANT

## 2022-01-27 PROCEDURE — 73564 XR KNEE ORTHO LEFT WITH FLEXION: ICD-10-PCS | Mod: 26,LT,, | Performed by: RADIOLOGY

## 2022-01-27 PROCEDURE — 73562 XR KNEE ORTHO LEFT WITH FLEXION: ICD-10-PCS | Mod: 26,RT,, | Performed by: RADIOLOGY

## 2022-01-27 PROCEDURE — 73562 X-RAY EXAM OF KNEE 3: CPT | Mod: 26,RT,, | Performed by: RADIOLOGY

## 2022-01-27 NOTE — LETTER
January 27, 2022      [facility]  [address]  [place] [address]  Phone: [phone]  Fax: [phone]       Patient: Alexey Alston   YOB: 1965  Date of Visit: 01/27/2022    To Whom It May Concern:    Hussain Alston  was at [facility] on 01/27/2022. The patient may return to work/school  with no restrictions. If you have any questions or concerns, or if I can be of further assistance, please do not hesitate to contact me.    Sincerely,            Barbara Velazquez PA-C

## 2022-01-27 NOTE — PROGRESS NOTES
Physical Therapy Daily Treatment Note + Discharge Note      Name: Alexey [facility] Number: [id number]     Therapy Diagnosis:   Encounter Diagnoses   Name Primary?    Gait abnormality     Fall in home, sequela     Quadriceps weakness     Decreased range of motion of left knee      Physician: Matthew Anthony MD    Visit Date: 1/27/2022    Physician Orders: PT Eval and Treat  Medical Diagnosis from Referral: Closed nondisplaced bicondylar fracture of left tibia, initial encounter   Evaluation Date: 9/22/2021  Authorization Period Expiration: 12/31/2022  Plan of Care Expiration: 12/10/2021 (extend to 01/31/22)  Visit # / Visits authorized: 4/20 (+22 from previous authorization)  FOTO: 3/3(visit 18)    Progress Note #2 (performed on 12/22/2021)    Time In:1415  Time Out: 1500  Total Billable Time: 40 minutes     Precautions: Weightbearing full weight bearing now   Subjective     Pt reports: no pain reported today. He is feeling a whole lot better and was a little sore after last visit. Today's treatment session was great.    He was compliant with home exercise program.    Response to previous treatment: He felt good after last visit.     Functional change: Patient has no pain.    Pain: 0/10  Location: left knee      Objective         Knee AROM/PROM Right Left Pain/Dysfunction with Movement Goal   Knee Flexion (135) 110 110 Stiffness 110   Knee Extension (0) 0 5 Stiffness 0         STRENGTH:     L/E MMT Right Left Pain/Dysfunction with Movement Goal   Hip Flexion  4/5 4/5   4+/5 B   Hip Extension  4/5 4/5   4+/5 B   Hip Abduction  4/5 4/5   4+/5 B   Knee Extension 4+/5 4+/5   5/5 B   Knee Flexion 4+/5 4+/5   5/5 B   Hip IR 4/5 4/5   4+/5 B   Hip ER 4/5 4/5   4+/5 B   Ankle DF 4+/5 4+/5   5/5 B   Ankle PF 4+/5 4+/5  5/5 B             TREATMENT       Alexey received therapeutic exercises to develop strength, endurance, ROM, flexibility, posture and core stabilization for (25) minutes including:   Intervention  "Performed Today    Recumbent bike  Nu Step   x   Level 5, 6 minutes   Quad sets  3 minutes LLE   Heel Slide (0-90)  3x10    SHORT ARC QUAD   3 minutes, 2 pounds, core activation   side lying Hip Abduction  3 x 10 2lb   SLR with ankle pump  5slr x 10 ankle pump   Windshield wipers  3x10   sidelying Clams   20x (B)   Prone quad stretch with stretch rite  10 second holds x 3 minutes   LONG ARC QUAD   3x10 5 pounds    standing calf stretch  x 3x30s   Bridges x 3x10   SLR   2 sets of 10 repetition, left lower extremity with assistance   Three way hip   3 x 10 LLE   Hamstring Curl standing x 3 x 10 3 5# Right LOWER EXTREMITY    SL Box squats  x 3x10  20" box (increased)   Supine marching  3 Minutes    Supine hip abduction  3 minutes with belt, with core activation   DOUBLE KNEE TO CHEST   15x   Pelvic tilt  30x   Parallel bars: Standing Hip Flex  10 reps switching x 2 minutes   Standing Hip Extension  10 reps switching x 2 minutes   Standing Hip Abduction  10 reps switching x 2 minutes   Standing Heel Raises  30 reps    Stairs x 3x5 laps       Plan for Next Visit:        Alexey participated in neuromuscular re-education activities to improve: Balance, Coordination, Kinesthetic, Sense, Proprioception and Posture for (15) minutes. The following activities were included:    Intervention Performed Today    Quad sets 5'   Venezuelan 5/5   SHORT AR QUAD 5'  Venezuelan 5/5   LONG ARC QUAD 5'  Venezuelan 5/5   Terminal knee extension 5'  Russian 5/5 BLUE THERABAND    Treadmill walking working on proper mechanics and reciprocal gait pattern   Minutes progressing to 2.3 mph   Transition Heel to toe at the left leg during stance   X 3 minutes   Pallof Press X  3x12 (B) 30lb cable machine    Farm Carries  X  6x30 yds 20lbs    Heel taps  x 3x0 (B) bottom stairs   Lateral walks/ Monster walks   6x20 yds blue band        Patient participated in manual therapy techniques 0 minutes:    Intervention Performed Today    End range knee flexion   "   Tibial rotation/screw home              Home Exercises Provided and Patient Education Provided     Education provided:   · - Patient educated on the impairments noted above and the effects of physical therapy intervention to improve overall condition and QOL.   · Patient was educated on all the above exercise prior/during/after for proper posture, positioning, and execution for safe performance with home exercise program.   · Patient educated on the use of pillows to aid in neutral alignment of spine and extremities when sleeping in supine or side lying.  · Patient educated on the importance of improved core and upper and lower extremity strength in order to improve alignment of the spine and upper and lower extremities with static positions and dynamic movement.   Patient educated on the importance of strong core and lower extremity musculature in order to improve both static and dynamic balance, improve gait mechanics, reduce fall risk and improve household and community mobility.     Written Home Exercises Provided: Patient instructed to cont prior HEP.  Exercises were reviewed and Alexey was able to demonstrate them prior to the end of the session.  Alexey demonstrated good  understanding of the education provided.     See EMR under Patient Instructions for exercises provided prior visit.    Assessment     Alexey Alston tolerated PT session well with no  complaints of pain or discomfort.. Objective findings are improving with measurements of ROM and functional mobility.  Therapy exercises were reviewed by revisiting exercises given from previous home exercise program while adding no new exercises.  Handouts were not issued during today's visit. Alexey demonstrated good understanding of new exercises and will continue to progress at home until next follow-up    Pt prognosis is Good.     Patient will be discharge secondary to independence in the home and community environment.     Pt's spiritual, cultural and  educational needs considered and pt agreeable to plan of care and goals.     Anticipated barriers to physical therapy: co-morbidities, sedentary lifestyle and occupation    Goals:       Short Term Goals:  6 weeks Progress    1. Pain: Pt will demonstrate improved pain by reports of less than or equal to 5/10 worst pain on the verbal rating scale in order to progress toward maximal functional ability and improve QOL. MET   2. Function: Patient will demonstrate improved function as indicated by a functional limitation score of less than or equal to 59 out of 100 on FOTO. MET   3. Mobility: Patient will improve AROM to 50% of stated goals, listed in objective measures above, in order to progress towards independence with functional activities.  MET   4. Strength: Patient will improve strength to 50% of stated goals, listed in objective measures above, in order to progress towards independence with functional activities.  PM   5. HEP: Patient will demonstrate independence with HEP in order to progress toward functional independence. MET      Long Term Goals:  12 weeks Progress   1. Pain: Pt will demonstrate improved pain by reports of less than or equal to 2/10 worst pain on the verbal rating scale in order to progress toward maximal functional ability and improve QOL.   MET   2. Function: Patient will demonstrate improved function as indicated by a functional limitation score of less than or equal to 41 out of 100 on FOTO. PM   3. Mobility: Patient will improve AROM to stated goals, listed in objective measures above, in order to return to maximal functional potential and improve quality of life. MET   4. Strength: Patient will improve strength to stated goals, listed in objective measures above, in order to improve functional independence and quality of life. MET   5. Gait: Patient will demonstrate normalized gait mechanics with minimal compensation in order to return to PLOF. MET   6. Patient will return to normal  ADL's, IADL's, community involvement, recreational activities, and work-related activities with less than or equal to 2/10 pain and maximal function.  MET      Goals Key:  PC= progressing/continue;        PM= partially met;             DC= discontinue      Plan     Continue POC and frequency as planned.     12/21/2021 (Extended to 01/31/22)     These services are reasonable and necessary for the conditions set forth above while under my care.     Matthew Sen, PT, DPT

## 2022-01-27 NOTE — PATIENT INSTRUCTIONS
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    Left knee tibial plateau fracture- treated non-operative with conservative physical therapy     Encounter Diagnoses   Name Primary?   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    Thank you for choosing [facility] and Dr. Matthew Anthony for your orthopedic & sports medicine care. It is our goal to provide you with exceptional care that will help keep you healthy, active, and get you back in the game.    If you felt that you received exemplary care today, please consider leaving us feedback on [email or web address] at [email or web address].     Please do not hesitate to reach out to us via email, phone, or MyChart with any questions, concerns, or feedback.    If you are experiencing pain/discomfort ,or have questions after 5pm and would like to be connected to the [facility] on-call team, please call this number and specify which Sports Medicine provider is treating you: [phone]

## 2022-01-27 NOTE — PROGRESS NOTES
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 Has completed physical therapy at [facility].     Symptom Onset:   He Pain began on 9/16/2021.  There was a specific injury.  Slipping and falling  Prior Treatment:    He has previously tried [facility] w/ Matthew.   Current Treatment:    4 months s/p Lateral tibial plateau fracture 9/16/2021.   [facility]  Current Symptoms:    Average level of pain is 0/10.  Pain is improving.    Denies any fevers, chills, night sweats, numbness and tingling.      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 States that he is having improvement has been partially weight bearing and has continued physical therapy at [facility]. States that he has no pain. Has improved range of motion. Denies any fevers, chill, night sweats, numbness and tingling.   Previous Plan:  · Start to fully weight bear on left knee in brace  · Continued physical therapy  · Recheck in 6 weeks with X-rays on return      Past Medical History:   Past Medical History:   Diagnosis Date    Hypertension      Past Surgical History:   Procedure Laterality Date    ANKLE SURGERY      KNEE SURGERY      Left knee patellar tendon repair, ACL reconstruction    KNEE SURGERY      Right knee patellar tendon repair     History reviewed. No pertinent family history.  Social History     Socioeconomic History    Marital status:     Tobacco Use    Smoking status: Never Smoker    Smokeless tobacco: Current User   Substance and Sexual Activity    Alcohol use: Yes     Comment: occ     Medication List with Changes/Refills   Current Medications    ALBUTEROL (PROVENTIL/VENTOLIN HFA) 90 MCG/ACTUATION INHALER    Inhale 1-2 puffs into the lungs every 6 (six) hours as needed. Rescue    ASPIRIN 81 MG CHEW    Take 81 mg by mouth once daily.    FLUTICASONE (FLONASE) 50 MCG/ACTUATION NASAL SPRAY    1 spray by Each Nare route 2 (two) times daily as needed for Rhinitis.    HYDROCHLOROTHIAZIDE (HYDRODIURIL) 12.5 MG TAB    Take 12.5 mg by mouth once daily.   Discontinued Medications    AZITHROMYCIN (ZITHROMAX Z-GALILEO) 250 MG TABLET    Take 2 tabs on day 1 and 1 tab days 2-5    HYDROCODONE-ACETAMINOPHEN (NORCO)  MG PER TABLET    Take 1 tablet by mouth every 6 (six) hours as needed for Pain.    LISINOPRIL 10 MG TABLET    Take 10 mg by mouth once daily.    MELOXICAM (MOBIC) 7.5 MG TABLET    Take 7.5 mg by mouth once daily.   Review of patient's allergies indicates:  No Known Allergies    Physical Exam:   Body mass index is 54.52 kg/m².  GENERAL: Well appearing, appropriate for stated age, no acute distress.  CARDIOVASCULAR: Pulses regular by peripheral palpation.  PULMONARY: Respirations are even and non-labored.  NEURO: Awake, alert, and oriented x 3.  PSYCH: Mood & affect are appropriate.  HEENT: Head is normocephalic and atraumatic.    General    Nursing note and vitals reviewed.          Right Knee Exam   Right knee exam is normal.    Inspection   Scars: present (post surgical changes)  Effusion: absent    Tenderness   The patient is experiencing no tenderness.     Crepitus   The patient has crepitus of the patella.    Range of Motion   Extension: 0   Flexion: 120     Tests   Ligament Examination Lachman: normal (-1 to 2mm) PCL-Posterior Drawer: normal (0 to 2mm)     MCL - Valgus: normal (0 to 2mm)  LCL - Varus: normal    Other   Sensation:  normal    Left Knee Exam   Left knee exam is normal.    Inspection   Scars: present (surgical changes)  Effusion: absent    Tenderness   The patient is experiencing no tenderness.     Crepitus   The patient has crepitus of the patella.    Range of Motion   Extension: 0   Flexion: 120     Tests   Stability Lachman: normal (-1 to 2mm) PCL-Posterior Drawer: normal (0 to 2mm)  MCL - Valgus: normal (0 to 2mm)  LCL - Varus: normal (0 to 2mm)    Other   Sensation: normal    Comments:  Mild irritation from brace on the medial and lateral aspect of where the brace has been rubbing.   No tenderness to palpation.     Muscle Strength   Right Lower Extremity   Hip Abduction: 5/5   Quadriceps:  5/5   Hamstrin/5   Left Lower Extremity   Hip Abduction: 5/5   Quadriceps:  5/5   Hamstrin/5     Vascular Exam     Right Pulses  Dorsalis Pedis:      2+  Posterior Tibial:      2+        Left Pulses  Dorsalis Pedis:      2+  Posterior Tibial:      2+             Neurovascular: All compartments are soft and compressible. Calf soft non-tender. Intact EHL, FHL, gastroc soleus, and tibialis anterior. Sensation intact to light touch in superficial peroneal, deep peroneal, tibial, sural, and saphenous nerve distributions. Foot warm and well perfused with capillary refill of less than 2 seconds and palpable pedal pulses.       Imaging:  X-ray Knee Ortho Left with Flexion  Narrative: EXAMINATION:  XR KNEE ORTHO LEFT WITH FLEXION    CLINICAL HISTORY:  . Pain in left knee    TECHNIQUE:  AP standing view of both knees, PA flexion standing views of both knees, and Merchant views of both knees were performed. A lateral view of the left knee was also performed.    COMPARISON:  Radiographs from 2021.    FINDINGS:  Healing left knee tibial plateau fracture noted without significant change when compared to the prior exam.  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  Stable bilateral  degenerative changes most pronounced in the medial compartments of either knee.  Unchanged postoperative changes of the right knee.  No acute fracture or dislocation.  Impression: Stable exam.    Electronically signed by: Semaj Bear MD  Date:    01/27/2022  Time:    16:28    Relevant imaging results reviewed and interpreted by me, discussed with the patient and / or family today.     Other Tests:   No other tests performed today.    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    Left knee tibial plateau fracture- treated non-operative with conservative physical therapy     Encounter Diagnoses   Name Primary?   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    Thank you for choosing [facility] and Dr. Matthew Anthony for your orthopedic & sports medicine care. It is our goal to provide you with exceptional care that will help keep you healthy, active, and get you back in the game.    If you felt that you received exemplary care today, please consider leaving us feedback on Healthgrades at [email or web address].     Please do not hesitate to reach out to us via email, phone, or MyChart with any questions, concerns, or feedback.    If you are experiencing pain/discomfort ,or have questions after 5pm and would like to be connected to the [facility]  [facility] on-call team, please call this number and specify which Sports Medicine provider is treating you: [phone]         Provider Note/Medical Decision Making:   Patient states that he is doing well. Will discuss an at home exercise program and recheck in 2 months just to see how they are doing.      I discussed worrisome and red flag signs and symptoms with the patient. The patient expressed understanding and agreed to alert me immediately or to go to the emergency room if they experience any of these.    Treatment plan was developed with input from the patient/family, and they expressed understanding and agreement with the plan. All questions were answered today.    Coding According to 2021 CPT Guidelines; 2/3 MDM Elements or Time as Indicated  EST Total Minutes  # Problems Addressed MDM Patient Management MDM TIME   99628 20-29 2 Minor, 1 chronic stable or 1 acute uncomplicated Rev/order 2 ext notes/tests Low Risk      11917 30-39 1 chronic with progression, or 1 new diagnosis or acute complicated inj Rev/order 3 ext notes/tests; or int of test performed by another QHP; or disc of management or test with external QHP Discussion of minor surg w risk factors, major surgery      NEW           07732 30-44 2 Minor, 1 chronic stable or 1 acute uncomplicated Rev 2 ext notes, order or int 2 tests Low Risk      95627 45-59 1 chronic with progression, or 1 new diagnosis or acute complicated inj Rev/order 3 ext notes/tests;or int of test performed by another QHP; or disc of management or test with external QHP Discussion of minor surg w risk factors, major surgery           Disclaimer: This note was prepared using a voice recognition system and is likely to have sound alike errors within the text.

## 2022-02-14 ENCOUNTER — PATIENT MESSAGE (OUTPATIENT)
Dept: ORTHOPEDICS | Facility: CLINIC | Age: 57
End: 2022-02-14
Payer: COMMERCIAL

## 2022-03-25 DIAGNOSIS — M25.562 LEFT KNEE PAIN, UNSPECIFIED CHRONICITY: Primary | ICD-10-CM

## 2022-07-07 ENCOUNTER — PATIENT MESSAGE (OUTPATIENT)
Dept: ADMINISTRATIVE | Facility: OTHER | Age: 57
End: 2022-07-07
Payer: COMMERCIAL